# Patient Record
Sex: FEMALE | Race: WHITE | NOT HISPANIC OR LATINO | Employment: OTHER | URBAN - METROPOLITAN AREA
[De-identification: names, ages, dates, MRNs, and addresses within clinical notes are randomized per-mention and may not be internally consistent; named-entity substitution may affect disease eponyms.]

---

## 2017-02-24 ENCOUNTER — HOSPITAL ENCOUNTER (OUTPATIENT)
Dept: PAIN MEDICINE | Facility: HOSPITAL | Age: 72
Discharge: HOME | End: 2017-02-24
Attending: ANESTHESIOLOGY | Admitting: ANESTHESIOLOGY

## 2017-02-24 LAB
AMPHETAMINES UR QL SCN: NEGATIVE
BZE UR QL SCN: NEGATIVE
CREAT 24H UR-MCNC: NORMAL MG/DL
METHADONE UR QL SCN: NEGATIVE
OPIATE CONFIRMATION URINE: NORMAL
THC SERPLBLD CFM-MCNC: NEGATIVE NG/ML

## 2017-04-28 ENCOUNTER — HOSPITAL ENCOUNTER (OUTPATIENT)
Dept: PAIN MEDICINE | Facility: HOSPITAL | Age: 72
Discharge: HOME | End: 2017-04-28
Attending: ANESTHESIOLOGY | Admitting: ANESTHESIOLOGY

## 2017-06-28 ENCOUNTER — HOSPITAL ENCOUNTER (OUTPATIENT)
Dept: PAIN MEDICINE | Facility: HOSPITAL | Age: 72
Discharge: HOME | End: 2017-06-28
Attending: ANESTHESIOLOGY | Admitting: ANESTHESIOLOGY

## 2017-08-30 ENCOUNTER — HOSPITAL ENCOUNTER (OUTPATIENT)
Dept: PAIN MEDICINE | Facility: HOSPITAL | Age: 72
Discharge: HOME | End: 2017-08-30
Attending: ANESTHESIOLOGY | Admitting: ANESTHESIOLOGY

## 2017-10-16 ENCOUNTER — HOSPITAL ENCOUNTER (OUTPATIENT)
Dept: MRI IMAGING | Facility: HOSPITAL | Age: 72
Discharge: HOME | End: 2017-10-16
Attending: ANESTHESIOLOGY | Admitting: ANESTHESIOLOGY

## 2017-11-17 ENCOUNTER — HOSPITAL ENCOUNTER (OUTPATIENT)
Dept: PAIN MEDICINE | Facility: HOSPITAL | Age: 72
Discharge: HOME | End: 2017-11-17
Attending: ANESTHESIOLOGY | Admitting: ANESTHESIOLOGY

## 2018-02-09 ENCOUNTER — HOSPITAL ENCOUNTER (OUTPATIENT)
Dept: PAIN MEDICINE | Facility: HOSPITAL | Age: 73
Discharge: HOME | End: 2018-02-09
Attending: ANESTHESIOLOGY | Admitting: ANESTHESIOLOGY

## 2018-03-15 ENCOUNTER — OFFICE (OUTPATIENT)
Dept: URBAN - METROPOLITAN AREA CLINIC 64 | Facility: CLINIC | Age: 73
End: 2018-03-15

## 2018-03-15 VITALS
WEIGHT: 133 LBS | HEART RATE: 50 BPM | HEIGHT: 61 IN | SYSTOLIC BLOOD PRESSURE: 133 MMHG | DIASTOLIC BLOOD PRESSURE: 78 MMHG

## 2018-03-15 DIAGNOSIS — R10.12 LEFT UPPER QUADRANT PAIN: ICD-10-CM

## 2018-03-15 PROCEDURE — 99214 OFFICE O/P EST MOD 30 MIN: CPT | Performed by: INTERNAL MEDICINE

## 2018-03-15 RX ORDER — HYOSCYAMINE SULFATE EXTENDED-RELEASE 0.38 MG/1
0.38 TABLET ORAL
Qty: 30 | Refills: 11 | Status: COMPLETED
Start: 2018-03-15 | End: 2018-07-18

## 2018-03-15 RX ORDER — DICYCLOMINE HYDROCHLORIDE 10 MG/1
CAPSULE ORAL
Refills: 0 | Status: COMPLETED
End: 2018-03-15

## 2018-03-15 RX ORDER — POLYETHYLENE GLYCOL 3350 17 G/17G
17 POWDER, FOR SOLUTION ORAL
Qty: 1 | Refills: 11 | Status: COMPLETED
Start: 2018-03-15 | End: 2018-07-18

## 2018-04-06 ENCOUNTER — HOSPITAL ENCOUNTER (OUTPATIENT)
Dept: URGENT CARE | Facility: CLINIC | Age: 73
Discharge: HOME | End: 2018-04-06
Attending: EMERGENCY MEDICINE | Admitting: EMERGENCY MEDICINE

## 2018-04-20 ENCOUNTER — HOSPITAL ENCOUNTER (OUTPATIENT)
Dept: PAIN MEDICINE | Facility: HOSPITAL | Age: 73
Discharge: HOME | End: 2018-04-20
Attending: ANESTHESIOLOGY | Admitting: ANESTHESIOLOGY

## 2018-05-11 ENCOUNTER — HOSPITAL ENCOUNTER (OUTPATIENT)
Dept: PAIN MEDICINE | Facility: HOSPITAL | Age: 73
Discharge: HOME | End: 2018-05-11
Attending: ANESTHESIOLOGY | Admitting: ANESTHESIOLOGY

## 2018-06-01 ENCOUNTER — HOSPITAL ENCOUNTER (OUTPATIENT)
Dept: PAIN MEDICINE | Facility: HOSPITAL | Age: 73
Discharge: HOME | End: 2018-06-01
Attending: ANESTHESIOLOGY | Admitting: ANESTHESIOLOGY

## 2018-07-18 ENCOUNTER — OFFICE (OUTPATIENT)
Dept: URBAN - METROPOLITAN AREA CLINIC 64 | Facility: CLINIC | Age: 73
End: 2018-07-18

## 2018-07-18 VITALS
WEIGHT: 137 LBS | SYSTOLIC BLOOD PRESSURE: 126 MMHG | HEART RATE: 52 BPM | HEIGHT: 61 IN | DIASTOLIC BLOOD PRESSURE: 62 MMHG

## 2018-07-18 DIAGNOSIS — R13.10 DYSPHAGIA, UNSPECIFIED: ICD-10-CM

## 2018-07-18 DIAGNOSIS — R10.10 UPPER ABDOMINAL PAIN, UNSPECIFIED: ICD-10-CM

## 2018-07-18 DIAGNOSIS — R11.0 NAUSEA: ICD-10-CM

## 2018-07-18 PROCEDURE — 99214 OFFICE O/P EST MOD 30 MIN: CPT | Performed by: NURSE PRACTITIONER

## 2018-07-18 RX ORDER — PANTOPRAZOLE SODIUM 40 MG/1
40 TABLET, DELAYED RELEASE ORAL
Qty: 90 | Refills: 3 | Status: COMPLETED
Start: 2018-07-18 | End: 2020-06-26

## 2018-08-10 ENCOUNTER — HOSPITAL ENCOUNTER (OUTPATIENT)
Dept: PAIN MEDICINE | Facility: HOSPITAL | Age: 73
Discharge: HOME | End: 2018-08-10
Attending: ANESTHESIOLOGY | Admitting: ANESTHESIOLOGY

## 2018-08-10 LAB
AMPHETAMINES UR QL SCN: NEGATIVE
BARBITURATES UR QL SCN: NEGATIVE
BENZODIAZ UR QL SCN: POSITIVE
BZE UR QL SCN: NEGATIVE
CREAT 24H UR-MCNC: ABNORMAL MG/DL
METHADONE UR QL SCN: NEGATIVE
OPIATE CONFIRMATION URINE: ABNORMAL
OPIATES TESTED UR SCN: POSITIVE
PCP UR QL: NEGATIVE
THC SERPLBLD CFM-MCNC: NEGATIVE NG/ML

## 2018-10-05 ENCOUNTER — HOSPITAL ENCOUNTER (OUTPATIENT)
Dept: PAIN MEDICINE | Facility: HOSPITAL | Age: 73
Discharge: HOME | End: 2018-10-05
Attending: ANESTHESIOLOGY | Admitting: ANESTHESIOLOGY

## 2018-10-15 ENCOUNTER — OFFICE (OUTPATIENT)
Dept: URBAN - METROPOLITAN AREA PATHOLOGY 4 | Facility: PATHOLOGY | Age: 73
End: 2018-10-15
Payer: MEDICARE

## 2018-10-15 ENCOUNTER — ON CAMPUS - OUTPATIENT (OUTPATIENT)
Dept: URBAN - METROPOLITAN AREA HOSPITAL 2 | Facility: HOSPITAL | Age: 73
End: 2018-10-15
Payer: MEDICARE

## 2018-10-15 ENCOUNTER — HOSPITAL ENCOUNTER (OUTPATIENT)
Dept: OTHER | Facility: HOSPITAL | Age: 73
Setting detail: SPECIMEN
Discharge: HOME | End: 2018-10-15
Attending: INTERNAL MEDICINE | Admitting: INTERNAL MEDICINE

## 2018-10-15 VITALS
OXYGEN SATURATION: 95 % | SYSTOLIC BLOOD PRESSURE: 151 MMHG | DIASTOLIC BLOOD PRESSURE: 61 MMHG | SYSTOLIC BLOOD PRESSURE: 169 MMHG | TEMPERATURE: 97.5 F | SYSTOLIC BLOOD PRESSURE: 178 MMHG | WEIGHT: 145 LBS | SYSTOLIC BLOOD PRESSURE: 173 MMHG | HEART RATE: 64 BPM | HEART RATE: 66 BPM | DIASTOLIC BLOOD PRESSURE: 91 MMHG | HEART RATE: 73 BPM | DIASTOLIC BLOOD PRESSURE: 121 MMHG | SYSTOLIC BLOOD PRESSURE: 141 MMHG | OXYGEN SATURATION: 96 % | OXYGEN SATURATION: 93 % | DIASTOLIC BLOOD PRESSURE: 78 MMHG | RESPIRATION RATE: 17 BRPM | HEART RATE: 77 BPM | SYSTOLIC BLOOD PRESSURE: 147 MMHG | DIASTOLIC BLOOD PRESSURE: 76 MMHG | RESPIRATION RATE: 15 BRPM | HEIGHT: 61 IN | DIASTOLIC BLOOD PRESSURE: 80 MMHG | DIASTOLIC BLOOD PRESSURE: 88 MMHG | HEART RATE: 62 BPM | SYSTOLIC BLOOD PRESSURE: 155 MMHG | HEART RATE: 69 BPM | OXYGEN SATURATION: 97 % | RESPIRATION RATE: 16 BRPM

## 2018-10-15 DIAGNOSIS — R11.0 NAUSEA: ICD-10-CM

## 2018-10-15 DIAGNOSIS — K31.89 OTHER DISEASES OF STOMACH AND DUODENUM: ICD-10-CM

## 2018-10-15 DIAGNOSIS — R13.10 DYSPHAGIA, UNSPECIFIED: ICD-10-CM

## 2018-10-15 DIAGNOSIS — K20.8 OTHER ESOPHAGITIS: ICD-10-CM

## 2018-10-15 DIAGNOSIS — R10.12 LEFT UPPER QUADRANT PAIN: ICD-10-CM

## 2018-10-15 DIAGNOSIS — K22.70 BARRETT'S ESOPHAGUS WITHOUT DYSPLASIA: ICD-10-CM

## 2018-10-15 LAB
GI HISTOLOGY: A. UNSPECIFIED: (no result)
GI HISTOLOGY: PDF REPORT: (no result)

## 2018-10-15 PROCEDURE — 43239 EGD BIOPSY SINGLE/MULTIPLE: CPT | Performed by: INTERNAL MEDICINE

## 2018-10-15 PROCEDURE — 88305 TISSUE EXAM BY PATHOLOGIST: CPT | Mod: 26 | Performed by: INTERNAL MEDICINE

## 2018-11-30 ENCOUNTER — HOSPITAL ENCOUNTER (OUTPATIENT)
Dept: PAIN MEDICINE | Facility: HOSPITAL | Age: 73
Discharge: HOME | End: 2018-11-30
Attending: ANESTHESIOLOGY | Admitting: ANESTHESIOLOGY

## 2018-11-30 LAB
AMPHETAMINES UR QL SCN: NEGATIVE
BARBITURATES UR QL SCN: NEGATIVE
BENZODIAZ UR QL SCN: ABNORMAL
BZE UR QL SCN: NEGATIVE
CREAT 24H UR-MCNC: ABNORMAL MG/DL
METHADONE UR QL SCN: NEGATIVE
OPIATE CONFIRMATION URINE: ABNORMAL
OPIATES TESTED UR SCN: ABNORMAL
PCP UR QL: NEGATIVE
THC SERPLBLD CFM-MCNC: NEGATIVE NG/ML

## 2019-01-14 ENCOUNTER — INPATIENT HOSPITAL (OUTPATIENT)
Dept: URBAN - METROPOLITAN AREA HOSPITAL 84 | Facility: HOSPITAL | Age: 74
End: 2019-01-14
Payer: MEDICARE

## 2019-01-14 DIAGNOSIS — R11.2 NAUSEA WITH VOMITING, UNSPECIFIED: ICD-10-CM

## 2019-01-14 DIAGNOSIS — I10 ESSENTIAL (PRIMARY) HYPERTENSION: ICD-10-CM

## 2019-01-14 DIAGNOSIS — K25.9 GASTRIC ULCER, UNSPECIFIED AS ACUTE OR CHRONIC, WITHOUT HEMO: ICD-10-CM

## 2019-01-14 DIAGNOSIS — K29.90 GASTRODUODENITIS, UNSPECIFIED, WITHOUT BLEEDING: ICD-10-CM

## 2019-01-14 DIAGNOSIS — R13.19 OTHER DYSPHAGIA: ICD-10-CM

## 2019-01-14 DIAGNOSIS — K20.8 OTHER ESOPHAGITIS: ICD-10-CM

## 2019-01-14 DIAGNOSIS — I73.9 PERIPHERAL VASCULAR DISEASE, UNSPECIFIED: ICD-10-CM

## 2019-01-14 DIAGNOSIS — F32.9 MAJOR DEPRESSIVE DISORDER, SINGLE EPISODE, UNSPECIFIED: ICD-10-CM

## 2019-01-14 DIAGNOSIS — I25.10 ATHEROSCLEROTIC HEART DISEASE OF NATIVE CORONARY ARTERY WITH: ICD-10-CM

## 2019-01-14 PROCEDURE — 99221 1ST HOSP IP/OBS SF/LOW 40: CPT | Mod: 25 | Performed by: INTERNAL MEDICINE

## 2019-01-14 PROCEDURE — 43239 EGD BIOPSY SINGLE/MULTIPLE: CPT | Performed by: INTERNAL MEDICINE

## 2019-01-15 ENCOUNTER — INPATIENT HOSPITAL (OUTPATIENT)
Dept: URBAN - METROPOLITAN AREA HOSPITAL 76 | Facility: HOSPITAL | Age: 74
End: 2019-01-15
Payer: MEDICARE

## 2019-01-15 DIAGNOSIS — F32.9 MAJOR DEPRESSIVE DISORDER, SINGLE EPISODE, UNSPECIFIED: ICD-10-CM

## 2019-01-15 DIAGNOSIS — I73.9 PERIPHERAL VASCULAR DISEASE, UNSPECIFIED: ICD-10-CM

## 2019-01-15 DIAGNOSIS — R13.19 OTHER DYSPHAGIA: ICD-10-CM

## 2019-01-15 DIAGNOSIS — I10 ESSENTIAL (PRIMARY) HYPERTENSION: ICD-10-CM

## 2019-01-15 DIAGNOSIS — K20.8 OTHER ESOPHAGITIS: ICD-10-CM

## 2019-01-15 DIAGNOSIS — K25.9 GASTRIC ULCER, UNSPECIFIED AS ACUTE OR CHRONIC, WITHOUT HEMO: ICD-10-CM

## 2019-01-15 DIAGNOSIS — I25.10 ATHEROSCLEROTIC HEART DISEASE OF NATIVE CORONARY ARTERY WITH: ICD-10-CM

## 2019-01-15 DIAGNOSIS — K29.90 GASTRODUODENITIS, UNSPECIFIED, WITHOUT BLEEDING: ICD-10-CM

## 2019-01-15 DIAGNOSIS — R11.2 NAUSEA WITH VOMITING, UNSPECIFIED: ICD-10-CM

## 2019-01-15 PROCEDURE — 99231 SBSQ HOSP IP/OBS SF/LOW 25: CPT | Performed by: NURSE PRACTITIONER

## 2019-02-08 ENCOUNTER — OFFICE (OUTPATIENT)
Dept: URBAN - METROPOLITAN AREA CLINIC 64 | Facility: CLINIC | Age: 74
End: 2019-02-08

## 2019-02-08 VITALS
SYSTOLIC BLOOD PRESSURE: 157 MMHG | HEIGHT: 61 IN | WEIGHT: 138 LBS | DIASTOLIC BLOOD PRESSURE: 98 MMHG | HEART RATE: 95 BPM

## 2019-02-08 DIAGNOSIS — R07.89 OTHER CHEST PAIN: ICD-10-CM

## 2019-02-08 DIAGNOSIS — R11.0 NAUSEA: ICD-10-CM

## 2019-02-08 DIAGNOSIS — K21.9 GASTRO-ESOPHAGEAL REFLUX DISEASE WITHOUT ESOPHAGITIS: ICD-10-CM

## 2019-02-08 PROCEDURE — 99213 OFFICE O/P EST LOW 20 MIN: CPT | Performed by: NURSE PRACTITIONER

## 2019-03-25 ENCOUNTER — OFFICE (OUTPATIENT)
Dept: URBAN - METROPOLITAN AREA CLINIC 64 | Facility: CLINIC | Age: 74
End: 2019-03-25

## 2019-03-25 VITALS
WEIGHT: 130 LBS | HEIGHT: 61 IN | DIASTOLIC BLOOD PRESSURE: 91 MMHG | HEART RATE: 80 BPM | SYSTOLIC BLOOD PRESSURE: 152 MMHG

## 2019-03-25 DIAGNOSIS — R63.4 ABNORMAL WEIGHT LOSS: ICD-10-CM

## 2019-03-25 DIAGNOSIS — K21.9 GASTRO-ESOPHAGEAL REFLUX DISEASE WITHOUT ESOPHAGITIS: ICD-10-CM

## 2019-03-25 DIAGNOSIS — R11.0 NAUSEA: ICD-10-CM

## 2019-03-25 PROCEDURE — 99213 OFFICE O/P EST LOW 20 MIN: CPT | Performed by: NURSE PRACTITIONER

## 2019-04-05 ENCOUNTER — HOSPITAL ENCOUNTER (OUTPATIENT)
Dept: PAIN MEDICINE | Facility: HOSPITAL | Age: 74
Discharge: HOME | End: 2019-04-05
Attending: ANESTHESIOLOGY | Admitting: ANESTHESIOLOGY

## 2019-04-30 ENCOUNTER — HOSPITAL ENCOUNTER (OUTPATIENT)
Dept: PAIN MEDICINE | Facility: HOSPITAL | Age: 74
Discharge: HOME | End: 2019-04-30
Attending: ANESTHESIOLOGY | Admitting: ANESTHESIOLOGY

## 2019-06-04 ENCOUNTER — CONVERSION ENCOUNTER (OUTPATIENT)
Dept: PAIN MEDICINE | Facility: CLINIC | Age: 74
End: 2019-06-04

## 2019-06-04 ENCOUNTER — HOSPITAL ENCOUNTER (OUTPATIENT)
Dept: PAIN MEDICINE | Facility: HOSPITAL | Age: 74
Discharge: HOME | End: 2019-06-04
Attending: ANESTHESIOLOGY | Admitting: ANESTHESIOLOGY

## 2019-06-04 VITALS
DIASTOLIC BLOOD PRESSURE: 68 MMHG | WEIGHT: 118 LBS | HEART RATE: 94 BPM | RESPIRATION RATE: 16 BRPM | OXYGEN SATURATION: 95 % | SYSTOLIC BLOOD PRESSURE: 110 MMHG | BODY MASS INDEX: 20.91 KG/M2 | HEIGHT: 63 IN

## 2019-06-06 NOTE — PROGRESS NOTES
HPI: CC Neck pain  Middle of back pain over to rt hip, had fall with pneumonia    74-year-old female with  multiple comorbidity including CAD/AFib on Plavix and Eliquis, chronic back pain bilateral lower extremity pain here for follow-up.  Usually sees Dr. Pickens.  Recent hospitalization for pneumonia and had fall in the hospital with contusions but no fracture:  maging negative.  Discharge to inpatient rehab.  Continued oxycodone there    Chronic back pain radiating to bilateral hips and bilateral lower extremity worse with standing walking or activity.  Denies new weakness bladder or bowel incontinence.          Utilizes oxycodone with good relief functional benefit denies side effects.    Mild relief with LESI      L-spine MRI 2017: Stable mild L2-L3 spondylolisthesis. Moderate to severe stable multilevel disc degeneration and mild multilevel facet arthritis contributing to mild spinal canal narrowing and varying degrees of mild to moderate neuroforaminal narrowing.    Referring MD: Ly Perez MD  Age: 74 Years Old  Sex: Female  Race: White    Pain Assessment   Location of Pain: Lower Back, R Hip, L Hip, R Leg, L Leg  Description of Pain: Dull/Aching, Throbbing, Stabbing  Previous Pain Rating : 8  Current Pain Rating: 10  Aggravating Factors: Activity  Alleviating Factors: Rest, Medication  Previous Treatments: Narcotic Pain Medication, Physical Therapy  Last Dose Pain medicine Percocet 7am 6/4/19  Have your bowel habits changed since you started taking pain medication? No  Epidural History Epidurals in the past no help      Past Medical History:     Heart Disease     Hiatal Hernia     Back Pain     Anxiety Disorder     Arthritis     Depression     Hyperlipidemia     Peripheral Vascular Disease     Thoracic Back Pain     Left Leg Pain     Diabetes, Type 2     G E R D     Anemia     diabetic     CHF     Osteoporosis     Kidney stones     Diverticulitis      Pneumonia  May 2019 at [facility] admitted and had  fallen    Past Surgical History:     Reviewed history from 10/05/2018 and no changes required:        Triple Heart Bypass        Breast Surgery-biopsy        Removals of tumors on foot        Bypass (leg)        Tubal Ligation        Cholecystectomy        Colonoscopy        EGD        cataract surgery    Family History Summary:      Reviewed history Last on 2019 and no changes required:2019  Father - Has Family History of Other Medical Problems - Entered On: 2018  Father - Has Family History of Heart Disease - Entered On: 2018  Mother - Has Family History of Other Cancer - Entered On: 2018  Mother - Has Family History of Other Diseases - Entered On: 2018  Mother - Has Family History of Hypertension - Entered On: 2018  Mother - Has Family History of Heart Disease - Entered On: 2018      Social History:     Reviewed history from 2018 and no changes required:        Marital Status:         Children: 3        Occupation: retired        Vital Signs:    Patient Profile:    74 Years Old Female  CC:         Neck pain and has fallen and had pneumonia, mid back pain to rt hip  Height:     63 inches  Weight:     118 pounds  BMI:        20.90     O2 Sat:     95 %  Temp:       97.9 degrees F  Pulse rate: 94 / minute  Resp:       16 per minute  BP Sittin / 68    Patient has a risk of falls? Yes    Problems: Active problems were reviewed with the patient during this visit.  Medications: Medications were reviewed with the patient during this visit.  Allergies: Allergies were reviewed with the patient during this visit.        Vitals Entered By: Penny Delaney (2019 11:24 AM)      Risk Factors:     Smoked Tobacco Use:  Former smoker     Cigarettes:  Yes        Years Since Last Quit:  19    Previous Tobacco Use: Signed On - 2019  Smoked Tobacco Use:  Former smoker     Cigarettes:  Yes -- 2 pack(s) per day,      Year quit:  2000        Years Since Last Quit:  19  years, 5 months, 3 days  Passive smoke exposure:  no  Drug use:  no  HIV high-risk behavior:  no  Caffeine use:  0 drinks per day    Previous Alcohol Use: Signed On - 04/30/2019  Alcohol use:  no  Seatbelt use:  100 %  Sun Exposure:  occasionally      Review of Systems        See HPI    MS        back pain, bilateral leg pain    The review of systems is negative for General, Eyes, ENT, CV, Resp, GI, , Derm, Neuro, Psych, Endo and Heme.      Physical Exam   General  General appearance: well developed, well nourished, no acute distress  Gait and station: antalgic  Comment: walker    Mental Status Exam   Mental Status: AAO x3  Behavior: Appropriate    Cervical   ROM decreased w/ lateral rotation  Spurling's Test Negative  Palpation: Tender  Paracervical    Thoracolumbar   ROM: decreased rotation/extension  Xu's Test: Negative  Straight Leg Raise: Positive  Radiculopathy: Right, Left  Palpation: Tender  Paravertebral    Muscle Stretch Reflex   Sensory Intact to light touch/pin prick      EXAM:   Eyes:      Clear conjunctivae,      Pupils rounds and reactive  ENT:      Clear oropharynx,       Mucosa moist/pink       Nose: no deformity  Chest Wall:      Non tender      No deformities or masses noted.    Respiratory:      ronchi  bilaterally in bases      No dyspnea but hypoxic 80's  Heart:      Regular rate and rhythm, syst murmurs, rubs, or gallops   Pulses:      Pulses 2+, symmetric  Extremities:      No clubbing, cyanosis, edema, or deformity noted   Neurologic:      No focal deficits      Coordination intact with rapid alternating movement. slow  Skin:      Intact without lesions or rashes.  No mass  Cervical Nodes:      No adenopathy noted.      Neuro   Reflexes: R Arm 1+  L arm 1+  R Leg 1+  L Leg 1+    Strength: Arms Normal  Strength: Legs Normal          EXAM:     Total Score Risk Category   Low Risk 0 - 3   Moderate Risk 4 - 7   High Risk > 8     Assessment   Status of Existing Problems:  Assessed  Myofascial pain syndrome as deteriorated - Isabel Vernon MD  Assessed Chronic pain as unchanged - Isabel Vernon MD  Assessed Spondylosis, lumbar, without myelopathy as unchanged - Isabel Vernon MD  Assessed Lumbar radiculitis as unchanged - Isabel Vernon MD  Comments:   74-year-old female with  multiple comorbidity including CAD/AFib on Plavix and Eliquis, chronic back pain bilateral lower extremity pain here for follow-up.  Usually sees Dr. Pickens.    Chronic pain from lumbar DDD spondylosis with bilateral lower extremity neuropathic pain.     recent hospitalization for pneumonia      Refill oxycodone 10/325 3 times daily as needed for severe pain.  UDS and inspect reviewed.  Discussed risk of tolerance, dependence, respiratory depression, coma and death associated with use of oral opioids for treatment of chronic nonmalignant pain.    Discussed increased risk with combining with chronic opioid and benzos.      RTC 1 mo with Dr. Pickens    Plan   New Prescriptions/Refills:  PERCOCET  MG ORAL TABLET (OXYCODONE-ACETAMINOPHEN) 1 po Q8H   Dx M54.12  #90 x 0,  06/04/2019, Isabel Vernon MD    Updated Medication List:   PERCOCET  MG ORAL TABLET (OXYCODONE-ACETAMINOPHEN) 1 po Q8H   Dx M54.12  TRAZODONE HCL 50 MG ORAL TABLET (TRAZODONE HCL) 1 po QHS  RISPERDAL 0.25 MG ORAL TABLET (RISPERIDONE) 1 po BID  DEPLIN 15 CAPSULE (L-METHYLFOLATE-ALGAE CAPS) one a day  VIIBRYD ORAL TABLET 20 MG (VILAZODONE HCL) TAKE 1 TABLET BY MOUTH IN THE MORNING WITH FOOD  AMITIZA 24 MCG ORAL CAPSULE (LUBIPROSTONE) 1 po BID [BMN]  DICYCLOMINE HCL 10 MG ORAL CAPSULE (DICYCLOMINE HCL) 1 capsule three times a day  CEFUROXIME AXETIL 500 MG ORAL TABLET (CEFUROXIME AXETIL) 1 po bid for 10 days  CEFUROXIME AXETIL 500 MG ORAL TABLET (CEFUROXIME AXETIL) 1 po bid for 10 days  METOPROLOL SUCCINATE  MG ORAL TABLET EXTENDED RELEASE 24 HOUR (METOPROLOL SUCCINATE)   RELISTOR 150 MG ORAL TABLET (METHYLNALTREXONE  BROMIDE) 3 po QD prn constipation [BMN]  FENOFIBRATE 40 MG ORAL TABLET (FENOFIBRATE) one a day  ALPRAZOLAM 1 MG ORAL TABLET (ALPRAZOLAM) 1 po tid PRN for anxiety  PROTONIX 40 MG ORAL PACKET (PANTOPRAZOLE SODIUM) every other day  NITROSTAT 0.4 MG SUBLINGUAL TABLET SUBLINGUAL (NITROGLYCERIN) 1 SL prn  AMBIEN 10 MG ORAL TABLET (ZOLPIDEM TARTRATE) 1 po Qhs  D 2000 2000 UNIT ORAL TABLET (CHOLECALCIFEROL) Daily  CLOPIDOGREL BISULFATE 75 MG ORAL TABLET (CLOPIDOGREL BISULFATE) Daily  NEURONTIN 100 MG ORAL CAPSULE (GABAPENTIN) 2 po BID  HYDRALAZINE HCL 25 MG ORAL TABLET (HYDRALAZINE HCL) Take one (1) tablet by mouth twice a day  ISOSORBIDE MONONITRATE ER 30 MG ORAL TABLET EXTENDED RELEASE 24 HOUR (ISOSORBIDE MONONITRATE) Daily  * LOPRESSOR TABS (METOPROLOL TARTRATE TABS)12.5MG Take 1 tablet by mouth daily  ZOFRAN 4 MG ORAL TABLET (ONDANSETRON HCL) 1 every 12 hours as needed    New Orders:   Ofc Vst, Est Level IV [[id number]]        Patient Instructions:  1)  Discussed importance of regular exercise and recommended starting or continuing a regular exercise program for good health.  2)  The patient was encouraged to lose weight for better health.    Medications:  PERCOCET  MG ORAL TABLET (OXYCODONE-ACETAMINOPHEN) 1 po Q8H   Dx M54.12  #90 x 0      Entered and Authorized by:  Isabel Vernon MD      Electronically signed by:   Isabel Vernon MD on 06/04/2019      Method used:    Print then Give to Patient      RxID:   [id number]    ]      Electronically signed by Isabel Vernon MD on 06/04/2019 at 12:46 PM  ________________________________________________________________________       Disclaimer: Converted Note message may not contain all data elements that existed in the [employer] source system. Please see [employer] for the original note details.

## 2019-06-28 ENCOUNTER — APPOINTMENT (OUTPATIENT)
Dept: PAIN MEDICINE | Facility: CLINIC | Age: 74
End: 2019-06-28

## 2019-07-08 ENCOUNTER — OFFICE (OUTPATIENT)
Dept: URBAN - METROPOLITAN AREA CLINIC 64 | Facility: CLINIC | Age: 74
End: 2019-07-08

## 2019-07-08 VITALS
WEIGHT: 121 LBS | SYSTOLIC BLOOD PRESSURE: 127 MMHG | HEART RATE: 71 BPM | HEIGHT: 61 IN | DIASTOLIC BLOOD PRESSURE: 59 MMHG

## 2019-07-08 DIAGNOSIS — K21.9 GASTRO-ESOPHAGEAL REFLUX DISEASE WITHOUT ESOPHAGITIS: ICD-10-CM

## 2019-07-08 DIAGNOSIS — K59.00 CONSTIPATION, UNSPECIFIED: ICD-10-CM

## 2019-07-08 PROCEDURE — 99213 OFFICE O/P EST LOW 20 MIN: CPT | Performed by: NURSE PRACTITIONER

## 2019-07-08 RX ORDER — PANTOPRAZOLE SODIUM 40 MG/1
80 TABLET, DELAYED RELEASE ORAL
Qty: 180 | Refills: 3 | Status: ACTIVE
Start: 2019-07-08

## 2019-07-18 ENCOUNTER — OFFICE (OUTPATIENT)
Dept: URBAN - METROPOLITAN AREA CLINIC 64 | Facility: CLINIC | Age: 74
End: 2019-07-18
Payer: MEDICAID

## 2020-01-13 ENCOUNTER — OFFICE (OUTPATIENT)
Dept: URBAN - METROPOLITAN AREA CLINIC 64 | Facility: CLINIC | Age: 75
End: 2020-01-13
Payer: MEDICAID

## 2020-01-13 VITALS
DIASTOLIC BLOOD PRESSURE: 76 MMHG | SYSTOLIC BLOOD PRESSURE: 119 MMHG | HEIGHT: 61 IN | WEIGHT: 115 LBS | HEART RATE: 78 BPM

## 2020-01-13 DIAGNOSIS — K59.00 CONSTIPATION, UNSPECIFIED: ICD-10-CM

## 2020-01-13 DIAGNOSIS — R11.0 NAUSEA: ICD-10-CM

## 2020-01-13 PROCEDURE — 99213 OFFICE O/P EST LOW 20 MIN: CPT | Performed by: NURSE PRACTITIONER

## 2020-01-13 RX ORDER — METHYLNALTREXONE BROMIDE 150 MG/1
450 TABLET ORAL
Qty: 90 | Refills: 11 | Status: ACTIVE
Start: 2020-01-13

## 2020-04-20 ENCOUNTER — INPATIENT HOSPITAL (OUTPATIENT)
Dept: URBAN - METROPOLITAN AREA HOSPITAL 76 | Facility: HOSPITAL | Age: 75
End: 2020-04-20
Payer: COMMERCIAL

## 2020-04-20 DIAGNOSIS — R93.3 ABNORMAL FINDINGS ON DIAGNOSTIC IMAGING OF OTHER PARTS OF DI: ICD-10-CM

## 2020-04-20 DIAGNOSIS — R13.10 DYSPHAGIA, UNSPECIFIED: ICD-10-CM

## 2020-04-20 DIAGNOSIS — K21.9 GASTRO-ESOPHAGEAL REFLUX DISEASE WITHOUT ESOPHAGITIS: ICD-10-CM

## 2020-04-20 PROCEDURE — 99223 1ST HOSP IP/OBS HIGH 75: CPT | Performed by: INTERNAL MEDICINE

## 2020-06-03 ENCOUNTER — ON CAMPUS - OUTPATIENT (OUTPATIENT)
Dept: URBAN - METROPOLITAN AREA HOSPITAL 76 | Facility: HOSPITAL | Age: 75
End: 2020-06-03
Payer: COMMERCIAL

## 2020-06-03 DIAGNOSIS — D64.89 OTHER SPECIFIED ANEMIAS: ICD-10-CM

## 2020-06-03 DIAGNOSIS — R50.9 FEVER, UNSPECIFIED: ICD-10-CM

## 2020-06-03 DIAGNOSIS — R05 COUGH: ICD-10-CM

## 2020-06-03 DIAGNOSIS — K21.9 GASTRO-ESOPHAGEAL REFLUX DISEASE WITHOUT ESOPHAGITIS: ICD-10-CM

## 2020-06-03 PROCEDURE — 99222 1ST HOSP IP/OBS MODERATE 55: CPT | Performed by: NURSE PRACTITIONER

## 2020-06-03 PROCEDURE — 99212 OFFICE O/P EST SF 10 MIN: CPT | Performed by: NURSE PRACTITIONER

## 2020-06-04 ENCOUNTER — ON CAMPUS - OUTPATIENT (OUTPATIENT)
Dept: URBAN - METROPOLITAN AREA HOSPITAL 76 | Facility: HOSPITAL | Age: 75
End: 2020-06-04

## 2020-06-04 DIAGNOSIS — D50.0 IRON DEFICIENCY ANEMIA SECONDARY TO BLOOD LOSS (CHRONIC): ICD-10-CM

## 2020-06-04 DIAGNOSIS — K22.70 BARRETT'S ESOPHAGUS WITHOUT DYSPLASIA: ICD-10-CM

## 2020-06-04 DIAGNOSIS — K25.9 GASTRIC ULCER, UNSPECIFIED AS ACUTE OR CHRONIC, WITHOUT HEMO: ICD-10-CM

## 2020-06-04 PROCEDURE — 43235 EGD DIAGNOSTIC BRUSH WASH: CPT | Performed by: INTERNAL MEDICINE

## 2020-06-26 ENCOUNTER — OFFICE (OUTPATIENT)
Dept: URBAN - METROPOLITAN AREA CLINIC 64 | Facility: CLINIC | Age: 75
End: 2020-06-26

## 2020-06-26 VITALS
DIASTOLIC BLOOD PRESSURE: 73 MMHG | WEIGHT: 120 LBS | HEIGHT: 61 IN | HEART RATE: 84 BPM | SYSTOLIC BLOOD PRESSURE: 133 MMHG

## 2020-06-26 DIAGNOSIS — R10.9 UNSPECIFIED ABDOMINAL PAIN: ICD-10-CM

## 2020-06-26 DIAGNOSIS — D50.0 IRON DEFICIENCY ANEMIA SECONDARY TO BLOOD LOSS (CHRONIC): ICD-10-CM

## 2020-06-26 DIAGNOSIS — K21.9 GASTRO-ESOPHAGEAL REFLUX DISEASE WITHOUT ESOPHAGITIS: ICD-10-CM

## 2020-06-26 DIAGNOSIS — K22.70 BARRETT'S ESOPHAGUS WITHOUT DYSPLASIA: ICD-10-CM

## 2020-06-26 PROCEDURE — 99214 OFFICE O/P EST MOD 30 MIN: CPT | Performed by: NURSE PRACTITIONER

## 2020-09-25 ENCOUNTER — OFFICE (OUTPATIENT)
Dept: URBAN - METROPOLITAN AREA CLINIC 64 | Facility: CLINIC | Age: 75
End: 2020-09-25
Payer: COMMERCIAL

## 2020-09-25 VITALS
HEART RATE: 65 BPM | SYSTOLIC BLOOD PRESSURE: 115 MMHG | WEIGHT: 126 LBS | HEIGHT: 61 IN | DIASTOLIC BLOOD PRESSURE: 74 MMHG

## 2020-09-25 DIAGNOSIS — K21.9 GASTRO-ESOPHAGEAL REFLUX DISEASE WITHOUT ESOPHAGITIS: ICD-10-CM

## 2020-09-25 DIAGNOSIS — D50.0 IRON DEFICIENCY ANEMIA SECONDARY TO BLOOD LOSS (CHRONIC): ICD-10-CM

## 2020-09-25 DIAGNOSIS — R10.9 UNSPECIFIED ABDOMINAL PAIN: ICD-10-CM

## 2020-09-25 PROCEDURE — 99212 OFFICE O/P EST SF 10 MIN: CPT | Performed by: NURSE PRACTITIONER

## 2021-01-29 ENCOUNTER — OFFICE (OUTPATIENT)
Dept: URBAN - METROPOLITAN AREA CLINIC 64 | Facility: CLINIC | Age: 76
End: 2021-01-29
Payer: COMMERCIAL

## 2021-01-29 VITALS — HEIGHT: 61 IN | WEIGHT: 133 LBS

## 2021-01-29 DIAGNOSIS — I25.10 ATHEROSCLEROTIC HEART DISEASE OF NATIVE CORONARY ARTERY WITH: ICD-10-CM

## 2021-01-29 DIAGNOSIS — K21.9 GASTRO-ESOPHAGEAL REFLUX DISEASE WITHOUT ESOPHAGITIS: ICD-10-CM

## 2021-01-29 DIAGNOSIS — K22.70 BARRETT'S ESOPHAGUS WITHOUT DYSPLASIA: ICD-10-CM

## 2021-01-29 DIAGNOSIS — R10.13 EPIGASTRIC PAIN: ICD-10-CM

## 2021-01-29 DIAGNOSIS — Z79.02 LONG TERM (CURRENT) USE OF ANTITHROMBOTICS/ANTIPLATELETS: ICD-10-CM

## 2021-01-29 DIAGNOSIS — R13.10 DYSPHAGIA, UNSPECIFIED: ICD-10-CM

## 2021-01-29 DIAGNOSIS — K59.00 CONSTIPATION, UNSPECIFIED: ICD-10-CM

## 2021-01-29 PROCEDURE — 99214 OFFICE O/P EST MOD 30 MIN: CPT | Performed by: NURSE PRACTITIONER

## 2021-02-09 ENCOUNTER — LAB REQUISITION (OUTPATIENT)
Dept: LAB | Facility: HOSPITAL | Age: 76
End: 2021-02-09

## 2021-02-09 DIAGNOSIS — R35.0 FREQUENCY OF MICTURITION: ICD-10-CM

## 2021-02-09 DIAGNOSIS — R41.0 DISORIENTATION, UNSPECIFIED: ICD-10-CM

## 2021-02-09 LAB
BILIRUB UR QL STRIP: NEGATIVE
CLARITY UR: CLEAR
COLOR UR: YELLOW
GLUCOSE UR STRIP-MCNC: NEGATIVE MG/DL
HGB UR QL STRIP.AUTO: NEGATIVE
KETONES UR QL STRIP: NEGATIVE
LEUKOCYTE ESTERASE UR QL STRIP.AUTO: NEGATIVE
NITRITE UR QL STRIP: NEGATIVE
PH UR STRIP.AUTO: 8 [PH] (ref 5–8)
PROT UR QL STRIP: NEGATIVE
SP GR UR STRIP: 1.01 (ref 1–1.03)
UROBILINOGEN UR QL STRIP: NORMAL

## 2021-02-09 PROCEDURE — 81003 URINALYSIS AUTO W/O SCOPE: CPT | Performed by: FAMILY MEDICINE

## 2021-02-10 ENCOUNTER — LAB REQUISITION (OUTPATIENT)
Dept: LAB | Facility: HOSPITAL | Age: 76
End: 2021-02-10

## 2021-02-10 DIAGNOSIS — R41.82 ALTERED MENTAL STATUS, UNSPECIFIED: ICD-10-CM

## 2021-02-10 LAB
ALBUMIN SERPL-MCNC: 3.3 G/DL (ref 3.5–5.2)
ALBUMIN/GLOB SERPL: 1.1 G/DL
ALP SERPL-CCNC: 140 U/L (ref 39–117)
ALT SERPL W P-5'-P-CCNC: 6 U/L (ref 1–33)
ANION GAP SERPL CALCULATED.3IONS-SCNC: 7.6 MMOL/L (ref 5–15)
AST SERPL-CCNC: 17 U/L (ref 1–32)
BILIRUB SERPL-MCNC: 0.6 MG/DL (ref 0–1.2)
BUN SERPL-MCNC: 7 MG/DL (ref 8–23)
BUN/CREAT SERPL: 9.1 (ref 7–25)
CALCIUM SPEC-SCNC: 9.6 MG/DL (ref 8.6–10.5)
CHLORIDE SERPL-SCNC: 103 MMOL/L (ref 98–107)
CO2 SERPL-SCNC: 31.4 MMOL/L (ref 22–29)
CREAT SERPL-MCNC: 0.77 MG/DL (ref 0.57–1)
DEPRECATED RDW RBC AUTO: 49.7 FL (ref 37–54)
ERYTHROCYTE [DISTWIDTH] IN BLOOD BY AUTOMATED COUNT: 15.8 % (ref 12.3–15.4)
GFR SERPL CREATININE-BSD FRML MDRD: 73 ML/MIN/1.73
GLOBULIN UR ELPH-MCNC: 3.1 GM/DL
GLUCOSE SERPL-MCNC: 84 MG/DL (ref 65–99)
HCT VFR BLD AUTO: 35.3 % (ref 34–46.6)
HGB BLD-MCNC: 11.1 G/DL (ref 12–15.9)
MCH RBC QN AUTO: 27.3 PG (ref 26.6–33)
MCHC RBC AUTO-ENTMCNC: 31.4 G/DL (ref 31.5–35.7)
MCV RBC AUTO: 86.9 FL (ref 79–97)
PLATELET # BLD AUTO: 168 10*3/MM3 (ref 140–450)
PMV BLD AUTO: 11.1 FL (ref 6–12)
POTASSIUM SERPL-SCNC: 4 MMOL/L (ref 3.5–5.2)
PROT SERPL-MCNC: 6.4 G/DL (ref 6–8.5)
RBC # BLD AUTO: 4.06 10*6/MM3 (ref 3.77–5.28)
SODIUM SERPL-SCNC: 142 MMOL/L (ref 136–145)
WBC # BLD AUTO: 4.71 10*3/MM3 (ref 3.4–10.8)

## 2021-02-10 PROCEDURE — 85027 COMPLETE CBC AUTOMATED: CPT | Performed by: FAMILY MEDICINE

## 2021-02-10 PROCEDURE — 80053 COMPREHEN METABOLIC PANEL: CPT | Performed by: FAMILY MEDICINE
